# Patient Record
Sex: MALE | Race: WHITE | Employment: FULL TIME | URBAN - METROPOLITAN AREA
[De-identification: names, ages, dates, MRNs, and addresses within clinical notes are randomized per-mention and may not be internally consistent; named-entity substitution may affect disease eponyms.]

---

## 2024-06-26 ENCOUNTER — HOSPITAL ENCOUNTER (OUTPATIENT)
Dept: CT IMAGING | Age: 19
Discharge: HOME | End: 2024-06-29
Payer: COMMERCIAL

## 2024-06-26 DIAGNOSIS — R16.1 SPLENOMEGALY: ICD-10-CM

## 2024-06-26 PROCEDURE — 74160 CT ABDOMEN W/CONTRAST: CPT

## 2024-06-26 PROCEDURE — 6360000004 HC RX CONTRAST MEDICATION: Performed by: FAMILY MEDICINE

## 2024-06-26 RX ADMIN — IOPAMIDOL 100 ML: 755 INJECTION, SOLUTION INTRAVENOUS at 16:04

## 2025-05-14 NOTE — PROGRESS NOTES
NEW PATIENT ABSTRACT    Referral Diagnosis: ROMEO    Referring Provider: Luis Walker MD    Primary Care Provider: None, None    Presenting Symptoms: Low Iron Level    Family History of Cancer: Cancer-related family history is not on file.    Past Medical History: No past medical history on file.    Chronological History of Pertinent Events (From Onset of Presenting Symptoms):    Record located in [employer] and [employer].  Patient notes and labs from referring provider are scanned into [employer].    Lab Work: 3/25/25  FERRITIN: 878  ng/mL (H)  IRON: 62  ug/dL   AST: 41 0-40 IU/L (H)  ALT: 80 0-44 IU/L (H)    WBC 5.4 3.4-10.8 X10E3/UL NORMAL   RBC 5.72 4.14-5.80 X10E6/UL NORMAL   HEMOGLOBIN 15.5 13.0-17.7 G/DL NORMAL    HEMATOCRIT 48.2 37.5-51.0 % NORMAL    MCV 84 79-97 FL NORMAL   MCH 27.1 26.6-33.0 PG NORMAL    MCHC 32.2 31.5-35.7 G/DL NORMAL   RDW 16.5 11.6-15.4 % ABOVE HIGH NORMAL   PLATELETS 192 150-450 X10E3/UL NORMAL   NEUTROPHILS 66 NOT ESTAB. % NORMAL   LYMPHS 28 NOT ESTAB. % NORMAL    MONOCYTES 5 NOT ESTAB. % NORMAL    EOS 1 NOT ESTAB. % NORMAL    BASOS 0 NOT ESTAB. % NORMAL   IMMATURE CELLS NP Cancelled 01   NEUTROPHILS (ABSOLUTE) 3.6 1.4-7.0 X10E3/UL NORMAL   LYMPHS (ABSOLUTE) 1.5 0.7-3.1 X10E3/UL NORMAL MONOCYTES(ABSOLUTE) 0.3 0.1-0.9 X10E3/UL NORMAL   EOS (ABSOLUTE) 0.0 0.0-0.4 X10E3/UL NORMAL  BASO (ABSOLUTE) 0.0 0.0-0.2 X10E3/UL NORMAL   IMMATURE GRANULOCYTES 0 NOT ESTAB. %    IMMATURE GRANS (ABS) 0.0 0.0-0.1 X10E3/UL        6/12/24- CT Abdomen Liver Protocol:  HEPATOBILIARY: Cholelithiasis versus sludge again noted. Previously seen hepatic cyst is not well visualized on current study.  SPLEEN: Redemonstration of splenomegaly measuring 15.0 cm.       Pertinent Notes from Referring Provider:   PCP states that oncology and gi have worked this up and give regular iron infusions but one has not been given since last year July 2023. however currently all labs are normal except ferritin which is

## 2025-05-29 ENCOUNTER — OFFICE VISIT (OUTPATIENT)
Dept: ONCOLOGY | Age: 20
End: 2025-05-29
Payer: COMMERCIAL

## 2025-05-29 VITALS
HEART RATE: 106 BPM | BODY MASS INDEX: 38.34 KG/M2 | WEIGHT: 253 LBS | TEMPERATURE: 98.9 F | RESPIRATION RATE: 12 BRPM | OXYGEN SATURATION: 99 % | HEIGHT: 68 IN | DIASTOLIC BLOOD PRESSURE: 66 MMHG | SYSTOLIC BLOOD PRESSURE: 110 MMHG

## 2025-05-29 DIAGNOSIS — D50.8 OTHER IRON DEFICIENCY ANEMIAS: Primary | ICD-10-CM

## 2025-05-29 DIAGNOSIS — E55.9 VITAMIN D DEFICIENCY: ICD-10-CM

## 2025-05-29 DIAGNOSIS — E83.19 IRON OVERLOAD: ICD-10-CM

## 2025-05-29 PROCEDURE — 99204 OFFICE O/P NEW MOD 45 MIN: CPT | Performed by: INTERNAL MEDICINE

## 2025-05-29 RX ORDER — ATOMOXETINE 80 MG/1
CAPSULE ORAL DAILY
COMMUNITY

## 2025-05-29 RX ORDER — ERGOCALCIFEROL 1.25 MG/1
50000 CAPSULE, LIQUID FILLED ORAL DAILY
Qty: 14 CAPSULE | Refills: 0 | Status: SHIPPED | OUTPATIENT
Start: 2025-05-29 | End: 2025-06-12

## 2025-05-29 RX ORDER — ANTIARTHRITIC COMBINATION NO.2 900 MG
TABLET ORAL
COMMUNITY

## 2025-05-29 RX ORDER — FEXOFENADINE HCL 180 MG/1
TABLET ORAL
COMMUNITY

## 2025-05-29 RX ORDER — ISOTRETINOIN 40 MG/1
CAPSULE ORAL
COMMUNITY

## 2025-05-29 RX ORDER — PANTOPRAZOLE SODIUM 40 MG/1
TABLET, DELAYED RELEASE ORAL
COMMUNITY
Start: 2024-05-14

## 2025-05-29 NOTE — PROGRESS NOTES
[address] [address]  Phone: [phone]        5/29/2025  Gustavo Silva  2005  [id number]      Gustavo Silva is a 20 year old  man who has been sent to my clinic by Dr. Luis Walker for evaluation of Iron Deficiency Anemia.      ALLERGIES:     No known drug allergies.      FAMILY HISTORY:     No hematologic disorders.      SOCIAL HISTORY:    He is single and lives alone. He works as a . He denies ever using any tobacco products.      PAST MEDICAL HISTORY:     Iron Deficiency Anemia and Vitamin D deficiency.      ROS:  The patient complained of fatigue; all other systems negative.       PHYSICAL EXAM:   The patient was alert, awake and oriented, no acute distress was noted. Oral examination did not reveal any mucosal lesions. Lymph node examination did not reveal any adenopathy. Neck examination revealed a supple neck, no thyromegaly or masses were noted. Chest examination revealed normal vesicular breath sounds. Heart examination revealed S-1 and S-2 without any murmurs. Abdominal examination revealed a non-tender abdomen, bowel sounds were positive, no organomegaly could be appreciated. Examination of the extremities did not reveal any tenderness or erythema. Examination of the skin did not reveal any lesions.      KPS:   100.      LABORATORY INVESTIGATIONS:  CBC showed a WBC count of 8.1, ANC was 6.1, Hemoglobin was 14.9 and Platelets were 181. Medical problems and test results were reviewed with the patient today.      ASSESSMENT:    Iron Deficiency Anemia; Vitamin D deficiency; Iron overload.      PLAN:     He may benefit from undergoing an USG of his abdomen. He should continue taking Vitamin D, his Hereditary Hemochromatosis work-up and SPEP with Immunof[employer] are pending; at his next clinic visit I will re-check his CBC, CMP, ESR, CRP and Ferritin level.      Thank you for allowing me to participate in the care of this

## 2025-06-02 ENCOUNTER — TELEPHONE (OUTPATIENT)
Dept: ONCOLOGY | Age: 20
End: 2025-06-02

## 2025-06-02 NOTE — TELEPHONE ENCOUNTER
Called cell phone, no answer. Patient was seen on Thursday, 05/29, and was supposed to have labs drawn after his OV. Labs still pending. Call to patient to see if he could come in for a lab appointment or if we could send orders to another lab that would be more convenient.

## 2025-06-04 ENCOUNTER — HOSPITAL ENCOUNTER (OUTPATIENT)
Dept: LAB | Age: 20
Discharge: HOME | End: 2025-06-04
Payer: COMMERCIAL

## 2025-06-04 DIAGNOSIS — E83.19 IRON OVERLOAD: ICD-10-CM

## 2025-06-04 DIAGNOSIS — D50.8 OTHER IRON DEFICIENCY ANEMIAS: ICD-10-CM

## 2025-06-04 LAB
25(OH)D3 SERPL-MCNC: 35.8 NG/ML (ref 30–100)
ALBUMIN SERPL-MCNC: 4 G/DL (ref 3.5–5)
ALBUMIN/GLOB SERPL: 1.1 (ref 1–1.9)
ALP SERPL-CCNC: 134 U/L (ref 40–129)
ALT SERPL-CCNC: 79 U/L (ref 8–55)
ANION GAP SERPL CALC-SCNC: 13 MMOL/L (ref 7–16)
AST SERPL-CCNC: 43 U/L (ref 15–37)
BASOPHILS # BLD: 0.02 K/UL (ref 0–0.2)
BASOPHILS NFR BLD: 0.3 % (ref 0–2)
BILIRUB SERPL-MCNC: 0.5 MG/DL (ref 0–1.2)
BUN SERPL-MCNC: 8 MG/DL (ref 6–23)
CALCIUM SERPL-MCNC: 10.2 MG/DL (ref 8.8–10.2)
CHLORIDE SERPL-SCNC: 103 MMOL/L (ref 98–107)
CO2 SERPL-SCNC: 24 MMOL/L (ref 20–29)
CREAT SERPL-MCNC: 0.85 MG/DL (ref 0.8–1.3)
DIFFERENTIAL METHOD BLD: ABNORMAL
EOSINOPHIL # BLD: 0.03 K/UL (ref 0–0.8)
EOSINOPHIL NFR BLD: 0.4 % (ref 0.5–7.8)
ERYTHROCYTE [DISTWIDTH] IN BLOOD BY AUTOMATED COUNT: 14.6 % (ref 11.9–14.6)
FERRITIN SERPL-MCNC: 871 NG/ML (ref 8–388)
GLOBULIN SER CALC-MCNC: 3.8 G/DL (ref 2.3–3.5)
GLUCOSE SERPL-MCNC: 119 MG/DL (ref 70–99)
HAV IGM SER QL: NONREACTIVE
HBV CORE IGM SER QL: NONREACTIVE
HBV SURFACE AG SER QL: NONREACTIVE
HCT VFR BLD AUTO: 46.7 % (ref 41.1–50.3)
HCV AB SER QL: NONREACTIVE
HGB BLD-MCNC: 14.9 G/DL (ref 13.6–17.2)
HIV 1+2 AB+HIV1 P24 AG SERPL QL IA: NONREACTIVE
HIV 1/2 RESULT COMMENT: NORMAL
IMM GRANULOCYTES # BLD AUTO: 0.02 K/UL (ref 0–0.5)
IMM GRANULOCYTES NFR BLD AUTO: 0.3 % (ref 0–5)
IRON SATN MFR SERPL: 15 % (ref 20–50)
IRON SERPL-MCNC: 49 UG/DL (ref 35–100)
LYMPHOCYTES # BLD: 1.56 K/UL (ref 0.5–4.6)
LYMPHOCYTES NFR BLD: 19.6 % (ref 13–44)
MCH RBC QN AUTO: 26.7 PG (ref 26.1–32.9)
MCHC RBC AUTO-ENTMCNC: 31.9 G/DL (ref 31.4–35)
MCV RBC AUTO: 83.5 FL (ref 82–102)
MONOCYTES # BLD: 0.28 K/UL (ref 0.1–1.3)
MONOCYTES NFR BLD: 3.5 % (ref 4–12)
NEUTS SEG # BLD: 6.04 K/UL (ref 1.7–8.2)
NEUTS SEG NFR BLD: 75.9 % (ref 43–78)
NRBC # BLD: 0 K/UL (ref 0–0.2)
PLATELET # BLD AUTO: 181 K/UL (ref 150–450)
PMV BLD AUTO: 10.5 FL (ref 9.4–12.3)
POTASSIUM SERPL-SCNC: 4 MMOL/L (ref 3.5–5.1)
PROT SERPL-MCNC: 7.8 G/DL (ref 6.3–8.2)
RBC # BLD AUTO: 5.59 M/UL (ref 4.23–5.6)
SODIUM SERPL-SCNC: 140 MMOL/L (ref 136–145)
TIBC SERPL-MCNC: 324 UG/DL (ref 240–450)
UIBC SERPL-MCNC: 275 UG/DL (ref 112–347)
VIT B12 SERPL-MCNC: 433 PG/ML (ref 193–986)
WBC # BLD AUTO: 8 K/UL (ref 4.3–11.1)

## 2025-06-04 PROCEDURE — 83540 ASSAY OF IRON: CPT

## 2025-06-04 PROCEDURE — 80053 COMPREHEN METABOLIC PANEL: CPT

## 2025-06-04 PROCEDURE — 82728 ASSAY OF FERRITIN: CPT

## 2025-06-04 PROCEDURE — 81256 HFE GENE: CPT

## 2025-06-04 PROCEDURE — 86334 IMMUNOFIX E-PHORESIS SERUM: CPT

## 2025-06-04 PROCEDURE — 87389 HIV-1 AG W/HIV-1&-2 AB AG IA: CPT

## 2025-06-04 PROCEDURE — 82784 ASSAY IGA/IGD/IGG/IGM EACH: CPT

## 2025-06-04 PROCEDURE — 82306 VITAMIN D 25 HYDROXY: CPT

## 2025-06-04 PROCEDURE — 84165 PROTEIN E-PHORESIS SERUM: CPT

## 2025-06-04 PROCEDURE — 36415 COLL VENOUS BLD VENIPUNCTURE: CPT

## 2025-06-04 PROCEDURE — 80074 ACUTE HEPATITIS PANEL: CPT

## 2025-06-04 PROCEDURE — 82607 VITAMIN B-12: CPT

## 2025-06-04 PROCEDURE — 85025 COMPLETE CBC W/AUTO DIFF WBC: CPT

## 2025-06-04 PROCEDURE — 83550 IRON BINDING TEST: CPT

## 2025-06-09 LAB
ALBUMIN SERPL ELPH-MCNC: 3.8 G/DL (ref 2.9–4.4)
ALBUMIN/GLOB SERPL: 1.2 (ref 0.7–1.7)
ALPHA1 GLOB SERPL ELPH-MCNC: 0.3 G/DL (ref 0–0.4)
ALPHA2 GLOB SERPL ELPH-MCNC: 0.9 G/DL (ref 0.4–1)
B-GLOBULIN SERPL ELPH-MCNC: 1.1 G/DL (ref 0.7–1.3)
GAMMA GLOB SERPL ELPH-MCNC: 1.1 G/DL (ref 0.4–1.8)
GLOBULIN SER-MCNC: 3.4 G/DL (ref 2.2–3.9)
HFE GENE MUT ANL BLD/T: NORMAL
IGA SERPL-MCNC: 138 MG/DL (ref 90–386)
IGG SERPL-MCNC: 1034 MG/DL (ref 603–1613)
IGM SERPL-MCNC: 125 MG/DL (ref 20–172)
INTERPRETATION SERPL IEP-IMP: NORMAL
M PROTEIN SERPL ELPH-MCNC: NORMAL G/DL
PROT SERPL-MCNC: 7.2 G/DL (ref 6–8.5)
REVIEWED BY: NORMAL

## 2025-06-11 DIAGNOSIS — E83.19 IRON OVERLOAD: Primary | ICD-10-CM

## 2025-06-11 DIAGNOSIS — D50.8 OTHER IRON DEFICIENCY ANEMIAS: ICD-10-CM

## 2025-06-11 DIAGNOSIS — E55.9 VITAMIN D DEFICIENCY: ICD-10-CM

## 2025-06-18 ENCOUNTER — HOSPITAL ENCOUNTER (OUTPATIENT)
Dept: LAB | Age: 20
Discharge: HOME | End: 2025-06-18
Payer: COMMERCIAL

## 2025-06-18 ENCOUNTER — OFFICE VISIT (OUTPATIENT)
Dept: ONCOLOGY | Age: 20
End: 2025-06-18
Payer: COMMERCIAL

## 2025-06-18 VITALS
HEART RATE: 96 BPM | HEIGHT: 68 IN | BODY MASS INDEX: 37.86 KG/M2 | WEIGHT: 249.8 LBS | OXYGEN SATURATION: 99 % | TEMPERATURE: 97.8 F | RESPIRATION RATE: 18 BRPM | DIASTOLIC BLOOD PRESSURE: 79 MMHG | SYSTOLIC BLOOD PRESSURE: 129 MMHG

## 2025-06-18 DIAGNOSIS — R79.89 ELEVATED FERRITIN LEVEL: ICD-10-CM

## 2025-06-18 DIAGNOSIS — E55.9 VITAMIN D DEFICIENCY: ICD-10-CM

## 2025-06-18 DIAGNOSIS — D50.8 OTHER IRON DEFICIENCY ANEMIAS: ICD-10-CM

## 2025-06-18 DIAGNOSIS — K76.0 HEPATIC STEATOSIS: Primary | ICD-10-CM

## 2025-06-18 DIAGNOSIS — E83.19 IRON OVERLOAD: ICD-10-CM

## 2025-06-18 LAB
ALBUMIN SERPL-MCNC: 3.8 G/DL (ref 3.5–5)
ALBUMIN/GLOB SERPL: 1.1 (ref 1–1.9)
ALP SERPL-CCNC: 130 U/L (ref 40–129)
ALT SERPL-CCNC: 73 U/L (ref 8–55)
ANION GAP SERPL CALC-SCNC: 12 MMOL/L (ref 7–16)
AST SERPL-CCNC: 41 U/L (ref 15–37)
BASOPHILS # BLD: 0.03 K/UL (ref 0–0.2)
BASOPHILS NFR BLD: 0.4 % (ref 0–2)
BILIRUB SERPL-MCNC: 0.4 MG/DL (ref 0–1.2)
BUN SERPL-MCNC: 9 MG/DL (ref 6–23)
CALCIUM SERPL-MCNC: 9.7 MG/DL (ref 8.8–10.2)
CHLORIDE SERPL-SCNC: 103 MMOL/L (ref 98–107)
CO2 SERPL-SCNC: 25 MMOL/L (ref 20–29)
CREAT SERPL-MCNC: 1.1 MG/DL (ref 0.8–1.3)
CRP SERPL-MCNC: 1.4 MG/DL (ref 0–0.4)
DIFFERENTIAL METHOD BLD: NORMAL
EOSINOPHIL # BLD: 0.05 K/UL (ref 0–0.8)
EOSINOPHIL NFR BLD: 0.7 % (ref 0.5–7.8)
ERYTHROCYTE [DISTWIDTH] IN BLOOD BY AUTOMATED COUNT: 14.5 % (ref 11.9–14.6)
ERYTHROCYTE [SEDIMENTATION RATE] IN BLOOD: 16 MM/HR (ref 0–15)
FERRITIN SERPL-MCNC: 789 NG/ML (ref 8–388)
GLOBULIN SER CALC-MCNC: 3.5 G/DL (ref 2.3–3.5)
GLUCOSE SERPL-MCNC: 102 MG/DL (ref 70–99)
HCT VFR BLD AUTO: 45 % (ref 41.1–50.3)
HGB BLD-MCNC: 14.4 G/DL (ref 13.6–17.2)
IMM GRANULOCYTES # BLD AUTO: 0.01 K/UL (ref 0–0.5)
IMM GRANULOCYTES NFR BLD AUTO: 0.1 % (ref 0–5)
LYMPHOCYTES # BLD: 1.94 K/UL (ref 0.5–4.6)
LYMPHOCYTES NFR BLD: 25.3 % (ref 13–44)
MCH RBC QN AUTO: 26.7 PG (ref 26.1–32.9)
MCHC RBC AUTO-ENTMCNC: 32 G/DL (ref 31.4–35)
MCV RBC AUTO: 83.3 FL (ref 82–102)
MONOCYTES # BLD: 0.45 K/UL (ref 0.1–1.3)
MONOCYTES NFR BLD: 5.9 % (ref 4–12)
NEUTS SEG # BLD: 5.19 K/UL (ref 1.7–8.2)
NEUTS SEG NFR BLD: 67.6 % (ref 43–78)
NRBC # BLD: 0 K/UL (ref 0–0.2)
PLATELET # BLD AUTO: 202 K/UL (ref 150–450)
PMV BLD AUTO: 10.8 FL (ref 9.4–12.3)
POTASSIUM SERPL-SCNC: 4.2 MMOL/L (ref 3.5–5.1)
PROT SERPL-MCNC: 7.2 G/DL (ref 6.3–8.2)
RBC # BLD AUTO: 5.4 M/UL (ref 4.23–5.6)
SODIUM SERPL-SCNC: 140 MMOL/L (ref 136–145)
WBC # BLD AUTO: 7.7 K/UL (ref 4.3–11.1)

## 2025-06-18 PROCEDURE — 85652 RBC SED RATE AUTOMATED: CPT

## 2025-06-18 PROCEDURE — 80053 COMPREHEN METABOLIC PANEL: CPT

## 2025-06-18 PROCEDURE — 82728 ASSAY OF FERRITIN: CPT

## 2025-06-18 PROCEDURE — 85025 COMPLETE CBC W/AUTO DIFF WBC: CPT

## 2025-06-18 PROCEDURE — 99213 OFFICE O/P EST LOW 20 MIN: CPT | Performed by: INTERNAL MEDICINE

## 2025-06-18 PROCEDURE — 86140 C-REACTIVE PROTEIN: CPT

## 2025-06-18 PROCEDURE — 36415 COLL VENOUS BLD VENIPUNCTURE: CPT

## 2025-06-18 NOTE — PROGRESS NOTES
[address] [address]  Phone: [phone]           6/18/2025  Gustavo Silva  2005  [id number]        Gustavo Silva is a 20 year old  man who has returned to my clinic for a follow-up visit; he was initially referred to me by Dr. Luis Walker for an elevated Ferritin level, he does not have Hereditary Hemochromatosis or evidence of secondary Iron overload.      REASON FOR CLINIC VISIT:     1 month follow-up and fatigue.        ALLERGIES:     No known drug allergies.        FAMILY HISTORY:     No hematologic disorders.        SOCIAL HISTORY:    He is single and lives alone. He works as a . He denies ever using any tobacco products.        PAST MEDICAL HISTORY:     Iron Deficiency Anemia and Vitamin D deficiency.        ROS:  The patient complained of fatigue; all other systems negative.        PHYSICAL EXAM:   The patient was alert, awake and oriented, no acute distress was noted. Oral examination did not reveal any mucosal lesions. Lymph node examination did not reveal any adenopathy. Neck examination revealed a supple neck, no thyromegaly or masses were noted. Chest examination revealed normal vesicular breath sounds. Heart examination revealed S-1 and S-2 without any murmurs. Abdominal examination revealed a non-tender abdomen, bowel sounds were positive, no organomegaly could be appreciated. Examination of the extremities did not reveal any tenderness or erythema. Examination of the skin did not reveal any lesions.        KPS:   100.        LABORATORY INVESTIGATIONS:  CBC showed a WBC count of 7.7, ANC was 5.1, Hemoglobin was 14.4 and Platelets were 202. Medical problems and test results were reviewed with the patient today.        ASSESSMENT:    Hepatic Steatosis ( stable ); Vitamin D deficiency ( stable ); increased Ferritin level ( stable ).        PLAN:    I will arrange for him to undergo an USG of his abdomen. He should

## 2025-06-18 NOTE — PATIENT INSTRUCTIONS
Patient Information from Today's Visit    The members of your [facility] are listed below:    Physician Provider: Dr. Marlon Brantley   Advanced Practice Clinician: Margot Simmons   Registered Nurse: Jennifer SMALL  Nurse Navigator: N/A  Medical Assistant: Manju LEMONS  : Margot \"Paz\" I.   Supportive Care Services: CHRISTINE Huffman    Diagnosis (Information Sheet Provided on Day of Diagnosis):     --Iron Deficiency Anemia      Follow Up Instructions:     -Reviewed labs.   -We are going to place an order for an abdominal ultrasound.   -We will recheck your labs and follow up in 3 months.       Has Treatment Plan Been Finalized? N/A     Current Labs:   Hospital Outpatient Visit on 06/18/2025   Component Date Value Ref Range Status    WBC 06/18/2025 7.7  4.3 - 11.1 K/uL Final    RBC 06/18/2025 5.40  4.23 - 5.6 M/uL Final    Hemoglobin 06/18/2025 14.4  13.6 - 17.2 g/dL Final    Hematocrit 06/18/2025 45.0  41.1 - 50.3 % Final    MCV 06/18/2025 83.3  82.0 - 102.0 FL Final    MCH 06/18/2025 26.7  26.1 - 32.9 PG Final    MCHC 06/18/2025 32.0  31.4 - 35.0 g/dL Final    RDW 06/18/2025 14.5  11.9 - 14.6 % Final    Platelets 06/18/2025 202  150 - 450 K/uL Final    MPV 06/18/2025 10.8  9.4 - 12.3 FL Final    nRBC 06/18/2025 0.00  0.0 - 0.2 K/uL Final    **Note: Absolute NRBC parameter is now reported with Hemogram**    Neutrophils % 06/18/2025 67.6  43.0 - 78.0 % Final    Lymphocytes % 06/18/2025 25.3  13.0 - 44.0 % Final    Monocytes % 06/18/2025 5.9  4.0 - 12.0 % Final    Eosinophils % 06/18/2025 0.7  0.5 - 7.8 % Final    Basophils % 06/18/2025 0.4  0.0 - 2.0 % Final    Immature Granulocytes % 06/18/2025 0.1  0.0 - 5.0 % Final    Neutrophils Absolute 06/18/2025 5.19  1.70 - 8.20 K/UL Final    Lymphocytes Absolute 06/18/2025 1.94  0.50 - 4.60 K/UL Final    Monocytes Absolute 06/18/2025 0.45  0.10 - 1.30 K/UL Final    Eosinophils Absolute 06/18/2025 0.05  0.00 - 0.80 K/UL Final    Basophils Absolute 06/18/2025 0.03

## 2025-07-09 ENCOUNTER — HOSPITAL ENCOUNTER (OUTPATIENT)
Dept: ULTRASOUND IMAGING | Age: 20
Discharge: HOME | End: 2025-07-12
Attending: INTERNAL MEDICINE
Payer: COMMERCIAL

## 2025-07-09 DIAGNOSIS — K76.0 HEPATIC STEATOSIS: ICD-10-CM

## 2025-07-09 PROCEDURE — 76705 ECHO EXAM OF ABDOMEN: CPT
